# Patient Record
Sex: FEMALE | Race: AMERICAN INDIAN OR ALASKA NATIVE
[De-identification: names, ages, dates, MRNs, and addresses within clinical notes are randomized per-mention and may not be internally consistent; named-entity substitution may affect disease eponyms.]

---

## 2022-02-13 ENCOUNTER — HOSPITAL ENCOUNTER (EMERGENCY)
Dept: HOSPITAL 5 - ED | Age: 27
Discharge: HOME | End: 2022-02-13
Payer: COMMERCIAL

## 2022-02-13 VITALS — SYSTOLIC BLOOD PRESSURE: 116 MMHG | DIASTOLIC BLOOD PRESSURE: 74 MMHG
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
## 2022-02-13 DIAGNOSIS — J45.909: ICD-10-CM
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
## 2022-02-13 DIAGNOSIS — S09.90XA: Primary | ICD-10-CM
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
## 2022-02-13 PROCEDURE — 99282 EMERGENCY DEPT VISIT SF MDM: CPT

## 2022-02-13 NOTE — EMERGENCY DEPARTMENT REPORT
ED General Adult HPI





- General


Chief complaint: Head Injury


Stated complaint: POSS CONCUSSION


PUI?: No


Time Seen by Provider: 02/13/22 17:36


Source: patient, RN notes reviewed


Mode of arrival: Ambulatory


Limitations: No Limitations





- History of Present Illness


Initial comments: 





The patient was evaluated in the emergency department for symptoms described in 

the history of present illness.  He/she was evaluated in the context of the 

global COVID-19 pandemic, which necessitated consideration that the patient 

might be at risk for infection with the virus that causes COVID-19.  

Institutional protocols and algorithms that pertain to the evaluation of 

patients at risk for COVID-19 are in a state of rapid change based on informa

tion released by regulatory bodies including the CDC and federal and state 

organizations.  These policies and algorithms were followed during the patient's

care in the emergency department.  Please note that these policies, procedures 

and recommendations changed on a rapid basis.








The patient is a 26-year-old female who reports that she is not pregnant, who 

also reports that she is not COVID-19 vaccinated.  She presents to the ER today 

with a complaint of closed head injury that was sustained this morning, at 

approximately 1:30 in the morning while in [facility].  The patient

reports that she was at a party, had consumed alcohol, and hit her head on 

glass.  The glass did not shatter.  She does not believe that she fell or hurt 

her head from standing.





She has a mild headache with nausea.  No vomiting.  No loss of vision.  No 

additional injuries or complaints.  No neck pain.  She reports that her mother 

drove her from [place] to [place].





She is seeking reassurance that she does not have a concussion.








-: This morning


Location: head


Severity scale (0 -10): 6


Quality: aching


Consistency: intermittent


Improves with: rest


Worsens with: movement





- Related Data


                                  Previous Rx's











 Medication  Instructions  Recorded  Last Taken  Type


 


Acetaminophen [Non-Aspirin Extra 500 mg PO Q6HR PRN #30 tablet 02/13/22 Unknown 

Rx





Strength]    


 


Ibuprofen [Motrin] 600 mg PO Q8H PRN #30 tablet 02/13/22 Unknown Rx


 


Metoclopramide [Reglan] 10 mg PO QID PRN #30 tablet 02/13/22 Unknown Rx











                                    Allergies











Allergy/AdvReac Type Severity Reaction Status Date / Time


 


No Known Allergies Allergy   Unverified 02/13/22 17:30














ED Review of Systems


ROS: 


Stated complaint: POSS CONCUSSION


Other details as noted in HPI





Constitutional: malaise.  denies: fever


Eyes: denies: eye discharge, vision change


ENT: denies: epistaxis


Respiratory: denies: cough


Cardiovascular: denies: chest pain


Gastrointestinal: nausea.  denies: vomiting


Neurological: headache.  denies: weakness





ED Past Medical Hx





- Past Medical History


Previous Medical History?: Yes


Hx Asthma: Yes





- Surgical History


Past Surgical History?: No





- Medications


Home Medications: 


                                Home Medications











 Medication  Instructions  Recorded  Confirmed  Last Taken  Type


 


Acetaminophen [Non-Aspirin Extra 500 mg PO Q6HR PRN #30 tablet 02/13/22  Unknown

 Rx





Strength]     


 


Ibuprofen [Motrin] 600 mg PO Q8H PRN #30 tablet 02/13/22  Unknown Rx


 


Metoclopramide [Reglan] 10 mg PO QID PRN #30 tablet 02/13/22  Unknown Rx














ED Physical Exam





- General


Limitations: No Limitations


General appearance: alert, in no apparent distress





- Head


Head exam: Present: atraumatic, normocephalic





- Eye


Eye exam: Present: normal appearance, PERRL, EOMI, other (Visual acuity intact 

to finger counting, color perception, reading at a close distance).  Absent: 

nystagmus





- ENT


ENT exam: Present: normal exam, normal orophraynx, mucous membranes moist, TM's 

normal bilaterally, normal external ear exam





- Neck


Neck exam: Present: normal inspection, full ROM.  Absent: tenderness, 

meningismus





- Respiratory


Respiratory exam: Present: normal lung sounds bilaterally.  Absent: respiratory 

distress, wheezes, rales, rhonchi, stridor, decreased breath sounds





- Cardiovascular


Cardiovascular Exam: Present: regular rate, normal rhythm, normal heart sounds. 

 Absent: bradycardia, tachycardia, irregular rhythm, systolic murmur, diastolic 

murmur, rubs, gallop





- GI/Abdominal


GI/Abdominal exam: Present: soft.  Absent: distended, tenderness, guarding, 

rebound, rigid, pulsatile mass





- Extremities Exam


Extremities exam: Present: normal inspection, full ROM, other (2+ pulses noted 

in the bilateral upper and lower extremities.  There is no palpable cord.   

negative Homans sign.  Muscular compartments are soft.  The pelvis is stable.). 

 Absent: pedal edema, calf tenderness





- Back Exam


Back exam: Present: normal inspection, full ROM.  Absent: tenderness, CVA 

tenderness (R), CVA tenderness (L), paraspinal tenderness, vertebral tenderness





- Neurological Exam


Neurological exam: Present: alert, oriented X3, normal gait, other (No facial 

droop.  Tongue midline.  Extraocular movements intact bilaterally.  Facial 

sensation intact to light touch in V1, V2, V3 distribution bilaterally.  5 and a

 5 strength in 4 extremities.  Sensation intact to light touch in 4 

extremities.).  Absent: motor sensory deficit





- Psychiatric


Psychiatric exam: Present: normal affect, normal mood





- Skin


Skin exam: Present: warm, dry, intact, normal color.  Absent: rash





ED Course





                                   Vital Signs











  02/13/22





  17:34


 


Temperature 98.2 F


 


Pulse Rate 70


 


Respiratory 20





Rate 


 


Blood Pressure 116/74





[Right] 


 


O2 Sat by Pulse 99





Oximetry 














ED Medical Decision Making





- Lab Data








                                   Vital Signs











  02/13/22





  17:34


 


Temperature 98.2 F


 


Pulse Rate 70


 


Respiratory 20





Rate 


 


Blood Pressure 116/74





[Right] 


 


O2 Sat by Pulse 99





Oximetry 














- Medical Decision Making





Differential diagnosis, including but not limited to: Concussion, closed head 

injury, encounter for medical screening examination





Assessment and plan:


26-year-old female, who is afebrile, with reassuring vital signs, who is 

clinically sober with a GCS of 15, NIH score of 0, patient is clinically sober 

at this time.  The cervical spine is cleared through nexus and [place] c spine 

rule presenting with mild closed head injury, and probable mild concussion.  

Discussed natural history of concussion.  Supportive care, oral medications, and

 outpatient follow-up.





All questions answered.





Patient low risk for significant intracranial injury as per the [place] head 

trauma rule.








Critical care attestation.: 


If time is entered above; I have spent that time in minutes in the direct care 

of this critically ill patient, excluding procedure time.








ED Disposition


Clinical Impression: 


 COVID-19 vaccination not done





Closed head injury


Qualifiers:


 Encounter type: initial encounter Qualified Code(s): S09.90XA - Unspecified 

injury of head, initial encounter





Disposition: 01 HOME / SELF CARE / HOMELESS


Is pt being admited?: No


Does the pt Need Aspirin: No


Condition: Good


Instructions:  Returning to School After a Concussion, Teen


Additional Instructions: 


As we discussed, patient is likely experiencing sequelae of minor closed head 

injury, and alcohol consumption.  We recommend that the patient abstain from 

alcohol consumption.  We also recommend that the patient not participate in 

heavy duty lifting, strenuous physical activity, or contact sports or athletics 

until cleared to do so by her primary care physician.  Symptoms of concussion 

may last for a few days, weeks or months.  We recommend follow-up with a primary

 care doctor within the next 7 to 10 days for repeat checkup and evaluation.





Advance diet as tolerated, drink plenty of fluids, patient may return to light 

athletics and light duty and 48 hours.





Recommend that patient complete COVID-19 vaccination series when able to.





Please return to the emergency room right away with new pain, worsened pain, 

migration of pain, projectile vomiting, change in mental status, confusion, 

inability tolerate liquid feeds, new, worsened or different symptoms not present

 on the initial emergency room evaluation


Referrals: 


[facility] [Provider Group] - 3-5 Days


Forms:  Work/School Release Form(ED)

## 2022-08-23 ENCOUNTER — HOSPITAL ENCOUNTER (EMERGENCY)
Dept: HOSPITAL 5 - ED | Age: 27
LOS: 2 days | Discharge: LEFT BEFORE DISCHARGE | End: 2022-08-25
Payer: COMMERCIAL

## 2022-08-23 VITALS — DIASTOLIC BLOOD PRESSURE: 71 MMHG | SYSTOLIC BLOOD PRESSURE: 122 MMHG

## 2022-08-23 DIAGNOSIS — Z53.21: ICD-10-CM

## 2022-08-23 DIAGNOSIS — R07.89: Primary | ICD-10-CM

## 2022-08-23 PROCEDURE — 93005 ELECTROCARDIOGRAM TRACING: CPT

## 2022-08-25 NOTE — ELECTROCARDIOGRAPH REPORT
[facility]

                                       

Test Date:    2022               Test Time:    20:21:26

Pat Name:     SAW ZAVALA              Department:   

Patient ID:   [id number]          Room:          

Gender:       F                        Technician:   MCKINLEY

:          1995               Requested By: FREDI FELICIANO

Order Number: [id number]             Reading MD:   Dmitriy Anne

                                 Measurements

Intervals                              Axis          

Rate:         60                       P:            42

MT:           167                      QRS:          61

QRSD:         75                       T:            30

QT:           408                                    

QTc:          408                                    

                           Interpretive Statements

Sinus rhythm

No previous ECG available for comparison

Electronically Signed On 2022 9:46:36 EDT by Dmitriy Anne